# Patient Record
Sex: FEMALE | URBAN - METROPOLITAN AREA
[De-identification: names, ages, dates, MRNs, and addresses within clinical notes are randomized per-mention and may not be internally consistent; named-entity substitution may affect disease eponyms.]

---

## 2022-07-20 ENCOUNTER — APPOINTMENT (RX ONLY)
Dept: URBAN - METROPOLITAN AREA CLINIC 35 | Facility: CLINIC | Age: 37
Setting detail: DERMATOLOGY
End: 2022-07-20

## 2022-07-20 DIAGNOSIS — Z41.9 ENCOUNTER FOR PROCEDURE FOR PURPOSES OTHER THAN REMEDYING HEALTH STATE, UNSPECIFIED: ICD-10-CM

## 2022-07-20 PROCEDURE — ? MEDICAL CONSULTATION: BROWN SPOTS

## 2022-07-20 PROCEDURE — ? ADDITIONAL NOTES

## 2022-07-20 NOTE — PROCEDURE: ADDITIONAL NOTES
Detail Level: Simple
Additional Notes: Pt in office for consultation for norma and hyperpigmentation. I suggested using a Retinol and Lytera, as well as making an appointment with a dermatologist to help with the norma. We discussed doing a spot treatment with the clarity to help with a brown spot above her right eyebrow.
Render Risk Assessment In Note?: no